# Patient Record
Sex: MALE | Race: WHITE | NOT HISPANIC OR LATINO | Employment: OTHER | URBAN - METROPOLITAN AREA
[De-identification: names, ages, dates, MRNs, and addresses within clinical notes are randomized per-mention and may not be internally consistent; named-entity substitution may affect disease eponyms.]

---

## 2017-01-25 ENCOUNTER — APPOINTMENT (OUTPATIENT)
Dept: PREADMISSION TESTING | Facility: HOSPITAL | Age: 77
End: 2017-01-25

## 2017-09-21 ENCOUNTER — OFFICE VISIT (OUTPATIENT)
Dept: ORTHOPEDIC SURGERY | Facility: CLINIC | Age: 77
End: 2017-09-21

## 2017-09-21 VITALS
DIASTOLIC BLOOD PRESSURE: 75 MMHG | HEART RATE: 58 BPM | BODY MASS INDEX: 31.5 KG/M2 | HEIGHT: 71 IN | SYSTOLIC BLOOD PRESSURE: 155 MMHG | WEIGHT: 225 LBS

## 2017-09-21 DIAGNOSIS — Z96.652 HISTORY OF LEFT KNEE REPLACEMENT: Primary | ICD-10-CM

## 2017-09-21 PROCEDURE — 99213 OFFICE O/P EST LOW 20 MIN: CPT | Performed by: PHYSICIAN ASSISTANT

## 2017-09-21 RX ORDER — OMEPRAZOLE 20 MG/1
CAPSULE, DELAYED RELEASE ORAL
COMMUNITY
Start: 2017-06-23

## 2017-09-21 NOTE — PROGRESS NOTES
Patient: Romulo Pinzon  : 1940    Primary Care Provider: Rianna Bradley MD    Requesting Provider: As above    Follow-up of the Left Knee (1 year)      History    Chief Complaint: routine f/up L TKA  Dr. Abbott    History of Present Illness: Patient returns for routine annual follow-up left total knee arthroplasty with Dr. Cornell in 2016.  He reports no pain or problems with the knee.  He is been very happy with the outcome.  He is exercising on a regular basis with stationary bicycle and strength training.  No complaints.        Allergies no known allergies  No current outpatient prescriptions on file prior to visit.     No current facility-administered medications on file prior to visit.      Social History     Social History   • Marital status:      Spouse name: N/A   • Number of children: N/A   • Years of education: N/A     Occupational History   • Not on file.     Social History Main Topics   • Smoking status: Never Smoker   • Smokeless tobacco: Never Used   • Alcohol use Yes      Comment: Occasional   • Drug use: No   • Sexual activity: Defer     Other Topics Concern   • Not on file     Social History Narrative     Past Surgical History:   Procedure Laterality Date   • APPENDECTOMY     • CATARACT EXTRACTION     • CHOLECYSTECTOMY     • CORONARY STENT PLACEMENT     • FEMUR SURGERY      right leg main artery   • HERNIA REPAIR     • OTHER SURGICAL HISTORY      leg circulation surgery   • REPLACEMENT TOTAL KNEE Left    • TONSILLECTOMY     • VASECTOMY       Family History   Problem Relation Age of Onset   • Heart disease Mother    • Stroke Father    • Hypertension Father    • Heart disease Father      Past Medical History:   Diagnosis Date   • Esophagitis    • Heart disease    • Hepatitis    • Hypertension    • Primary osteoarthritis of both knees    • Thin blood          Review of Systems   Constitutional: Negative.    HENT: Positive for postnasal drip and sneezing.    Eyes:  "Positive for itching.   Respiratory: Negative.    Cardiovascular: Negative.    Gastrointestinal: Negative.    Endocrine: Negative.    Genitourinary: Negative.    Musculoskeletal: Negative.    Skin: Negative.    Allergic/Immunologic: Negative.    Neurological: Negative.    Hematological: Negative.    Psychiatric/Behavioral: Negative.        The following portions of the patient's history were reviewed and updated as appropriate: allergies, current medications, past family history, past medical history, past social history, past surgical history and problem list.    Objective   /75  Pulse 58  Ht 71\" (180.3 cm)  Wt 225 lb (102 kg)  BMI 31.38 kg/m2    Physical Exam:   GENERAL: Body habitus: obese    Lower extremity edema: Left: none; Right: none    Varicose veins:  Left: none; Right: mild    Gait: normal     Mental Status:  awake and alert; oriented to person, place, and time    Voice:  clear  SKIN:  Normal    Hair Growth:  Right:normal; Left:  normal  HEENT: Head: Normocephalic, no lesions, without obvious abnormality.     Eyes: sclera anicteric  PULM:  Repiratory effort normal    Ortho Exam  Left Hip Exam  ---------  FLEXION CONTRACTURE: None  FLEXION: 110 degrees  INTERNAL ROTATION: 20 degrees at 90 degrees of flexion   EXTERNAL ROTATION: 40 degrees at 90 degrees of flexion    PAIN WITH HIP MOTION: no      Left Knee Exam  ----------  Knee Exam:  ----------  ALIGNMENT:  Left: neutral  ----------  RANGE OF MOTION:  Left: 0-120  LIGAMENTOUS STABILITY:   Left:stable to varus and valgus stress at terminal extension and 30 degrees without any evidence of laxity   ----------  STRENGTH:  KNEE FLEXION  Left 5/5  KNEE EXTENSION Left 5/5  ----------  PAIN WITH PALPATION: Left denies tenderness to palpation about the knee  PAIN WITH KNEE ROM:  Left no  PATELLAR CREPITUS:  Left no  ----------  SENSATION TO LIGHT TOUCH:  DEEP PERONEAL/SUPERFICIAL PERONEAL/SURAL/SAPHENOUS/TIBIAL:   Left intact  ----------SENSATION TO " LIGHT TOUCH:  EDEMA:   Left:  no  ERYTHEMA:  ; Left:  no  WOUNDS/INCISIONS: well healed anterior incision, no overlying skin problems.      Medical Decision Making    Data Review:   ordered and reviewed x-rays today    Assessment and Plan/ Diagnosis/Treatment options:   Doing well status post left total knee arthroplasty with Dr. Abbott figure 2016.  I reviewed today's x-rays and clinical findings with the patient.  X-rays today show well-positioned, cemented total knee arthroplasty with no evidence of osteolysis, subsidence or fracture.  On exam, he has good range of motion with stable ligamentous exam.  Patient reports no pain or problems from the knees he is doing a regular exercise routine to help maintain his strength.  Plan is continued observation.  He'll return in 2 years with repeat x-rays of left knee or sooner if needed.

## 2019-09-19 ENCOUNTER — OFFICE VISIT (OUTPATIENT)
Dept: ORTHOPEDIC SURGERY | Facility: CLINIC | Age: 79
End: 2019-09-19

## 2019-09-19 VITALS — HEART RATE: 61 BPM | BODY MASS INDEX: 30.92 KG/M2 | HEIGHT: 70 IN | WEIGHT: 216 LBS | OXYGEN SATURATION: 98 %

## 2019-09-19 DIAGNOSIS — Z96.652 HX OF TOTAL KNEE ARTHROPLASTY, LEFT: Primary | ICD-10-CM

## 2019-09-19 PROCEDURE — 99212 OFFICE O/P EST SF 10 MIN: CPT | Performed by: PHYSICIAN ASSISTANT

## 2019-09-19 NOTE — PROGRESS NOTES
[facility] [place] [facility] Note    Subjective     Patient: Romulo Pinzon  : 1940    Primary Care Provider: Rianna Bradley MD    Requesting Provider: As above    Follow-up (2 year recheck - f/up L TKA  Dr. Abbott)      History    Chief Complaint: Follow-up left total knee arthroplasty    History of Present Illness: Patient returns today for routine follow-up of left total knee Peter plasty with Dr. Cornell in 2016.  He reports no pain or problems with the knee.  He reports is the best thing he has ever done.    Current Outpatient Medications on File Prior to Visit   Medication Sig Dispense Refill   • amLODIPine (NORVASC) 10 MG tablet Take  by mouth Take As Directed.     • aspirin 81 MG tablet Take 81 mg by mouth Daily.     • Azilsartan Medoxomil 40 MG tablet Take  by mouth Daily.     • calcium carbonate (ANTACID CALCIUM) 500 MG chewable tablet Chew 2 tablets Daily.     • celecoxib (CeleBREX) 200 MG capsule Take  by mouth Take As Directed.     • Cholecalciferol (VITAMIN D3) 2000 units tablet Take 2 tablets by mouth Daily.     • clopidogrel (PLAVIX) 75 MG tablet Take 75 mg by mouth Daily.     • esomeprazole (nexIUM) 20 MG capsule Take 20 mg by mouth Daily.     • fenofibrate (TRICOR) 145 MG tablet Take 145 mg by mouth Daily.     • LORazepam (ATIVAN) 0.5 MG tablet Take  by mouth As Needed.     • metoprolol succinate XL (TOPROL-XL) 25 MG 24 hr tablet Take  by mouth Take As Directed.     • NEOMYCIN-POLYMYXIN-HC OT Administer 5 drops into ears 3 (Three) Times a Day.     • nitroglycerin (NITROSTAT) 0.4 MG SL tablet Place  under the tongue Take As Directed.     • omeprazole (priLOSEC) 20 MG capsule      • potassium chloride (MICRO-K) 10 MEQ CR capsule Take 10 mEq by mouth Daily.     • rosuvastatin (CRESTOR) 10 MG tablet Take 10 mg by mouth Daily.     • timolol (TIMOPTIC-XR) 0.5 % ophthalmic gel-forming Apply 1 drop to eye Daily.     • omega-3 acid ethyl esters (LOVAZA) 1 g capsule Take 2 g by  mouth Daily.     • oxyCODONE-acetaminophen (PERCOCET) 5-325 MG per tablet Take  by mouth Take As Directed.       No current facility-administered medications on file prior to visit.       No Known Allergies   Past Medical History:   Diagnosis Date   • Esophagitis    • Heart disease    • Hepatitis    • Hypertension    • Primary osteoarthritis of both knees    • Thin blood (CMS/HCC)      Past Surgical History:   Procedure Laterality Date   • APPENDECTOMY     • CATARACT EXTRACTION     • CHOLECYSTECTOMY     • CORONARY STENT PLACEMENT     • FEMUR SURGERY      right leg main artery   • HERNIA REPAIR     • OTHER SURGICAL HISTORY      leg circulation surgery   • REPLACEMENT TOTAL KNEE Left    • TONSILLECTOMY     • VASECTOMY       Family History   Problem Relation Age of Onset   • Heart disease Mother    • Stroke Father    • Hypertension Father    • Heart disease Father       Social History     Socioeconomic History   • Marital status:      Spouse name: Not on file   • Number of children: Not on file   • Years of education: Not on file   • Highest education level: Not on file   Tobacco Use   • Smoking status: Never Smoker   • Smokeless tobacco: Never Used   Substance and Sexual Activity   • Alcohol use: No     Frequency: Never   • Drug use: No   • Sexual activity: Defer        Review of Systems   Constitutional: Negative.    HENT: Negative.    Eyes: Negative.    Respiratory: Negative.    Cardiovascular: Negative.    Gastrointestinal: Negative.    Endocrine: Negative.    Genitourinary: Negative.    Musculoskeletal: Positive for arthralgias.   Skin: Negative.    Allergic/Immunologic: Negative.    Neurological: Negative.    Hematological: Negative.    Psychiatric/Behavioral: Negative.        The following portions of the patient's history were reviewed and updated as appropriate: allergies, current medications, past family history, past medical history, past social history, past surgical history and problem  "list.      Objective      Physical Exam  Pulse 61   Ht 177.8 cm (70\")   Wt 98 kg (216 lb)   SpO2 98%   BMI 30.99 kg/m²     Body mass index is 30.99 kg/m².    Patient is well developed, well nourished and in no acute distress.  Alert and oriented x 3.    Ortho Exam  Left Knee Exam  ----------  Knee Exam:  ----------  ALIGNMENT:  Left: neutral  ----------  RANGE OF MOTION:  Left: 0-135  LIGAMENTOUS STABILITY:   Left:stable to varus and valgus stress at terminal extension and 30 degrees without any evidence of laxity   ----------  STRENGTH:  KNEE FLEXION  Left 5/5  KNEE EXTENSION Left 5/5  ----------  PAIN WITH PALPATION: Left denies tenderness to palpation about the knee  PAIN WITH KNEE ROM:  Left no  PATELLAR CREPITUS:  Left no  ----------  SENSATION TO LIGHT TOUCH:  DEEP PERONEAL/SUPERFICIAL PERONEAL/SURAL/SAPHENOUS/TIBIAL:   Left intact  ----------  EFFUSION:   Left:  no  ERYTHEMA:  ; Left:  no  WOUNDS/INCISIONS: well healed anterior knee incision, no overlying skin problems.      Medical Decision Making    Data Review:   ordered and reviewed x-rays today    Assessment:  1. Hx of total knee arthroplasty, left        Plan:  Doing well status post left total knee arthroplasty with Dr. Cornell in 2016.  X-rays today show well-positioned, cemented total knee arthroplasty with no evidence of ostial lysis, subsidence or fracture.  Patient reports no pain or problems with the knee.  Plan is continued observation.  He will return in 2 years with repeat x-rays or sooner if needed.      Ale Bertrand PA-C  09/19/19  2:56 PM    "

## 2021-02-19 ENCOUNTER — APPOINTMENT (OUTPATIENT)
Dept: VACCINE CLINIC | Facility: HOSPITAL | Age: 81
End: 2021-02-19

## 2021-03-02 ENCOUNTER — APPOINTMENT (OUTPATIENT)
Dept: VACCINE CLINIC | Facility: HOSPITAL | Age: 81
End: 2021-03-02

## 2021-03-10 ENCOUNTER — OFFICE VISIT (OUTPATIENT)
Dept: ORTHOPEDIC SURGERY | Facility: CLINIC | Age: 81
End: 2021-03-10

## 2021-03-10 VITALS
SYSTOLIC BLOOD PRESSURE: 157 MMHG | HEIGHT: 70 IN | HEART RATE: 56 BPM | DIASTOLIC BLOOD PRESSURE: 70 MMHG | BODY MASS INDEX: 31.07 KG/M2 | WEIGHT: 217 LBS

## 2021-03-10 DIAGNOSIS — Z96.652 HX OF TOTAL KNEE ARTHROPLASTY, LEFT: Primary | ICD-10-CM

## 2021-03-10 PROCEDURE — 99212 OFFICE O/P EST SF 10 MIN: CPT | Performed by: PHYSICIAN ASSISTANT

## 2021-03-10 RX ORDER — DORZOLAMIDE HYDROCHLORIDE AND TIMOLOL MALEATE 20; 5 MG/ML; MG/ML
SOLUTION/ DROPS OPHTHALMIC
COMMUNITY
Start: 2021-02-03

## 2021-03-10 RX ORDER — DUTASTERIDE AND TAMSULOSIN HYDROCHLORIDE CAPSULES .5; .4 MG/1; MG/1
CAPSULE ORAL
COMMUNITY
Start: 2021-03-03

## 2021-03-10 RX ORDER — LATANOPROST 50 UG/ML
SOLUTION/ DROPS OPHTHALMIC
COMMUNITY
Start: 2021-02-21

## 2024-01-18 ENCOUNTER — TRANSCRIBE ORDERS (OUTPATIENT)
Dept: CT IMAGING | Facility: CLINIC | Age: 84
End: 2024-01-18
Payer: MEDICARE

## 2024-01-18 DIAGNOSIS — J06.9 URTI (ACUTE UPPER RESPIRATORY INFECTION): Primary | ICD-10-CM

## 2024-12-11 NOTE — PROGRESS NOTES
[facility] [place] [facility] Clinic Note    Subjective     Patient: Romulo Pinzon  : 1940    Primary Care Provider: Rianna Bradley MD    Requesting Provider: As above    Follow-up (18 month recheck- s/p L TKA  Dr. Abbott)      History    Chief Complaint: Follow-up left total knee    History of Present Illness: Patient returns today for routine follow-up left total knee arthroplasty with  in .  He reports no pain or problems with the knee.  Has been having very happy.    Current Outpatient Medications on File Prior to Visit   Medication Sig Dispense Refill   • amLODIPine (NORVASC) 10 MG tablet Take  by mouth Take As Directed.     • aspirin 81 MG tablet Take 81 mg by mouth Daily.     • calcium carbonate (ANTACID CALCIUM) 500 MG chewable tablet Chew 2 tablets Daily.     • Cholecalciferol (VITAMIN D3) 2000 units tablet Take 2 tablets by mouth Daily.     • clopidogrel (PLAVIX) 75 MG tablet Take 75 mg by mouth Daily.     • dorzolamide-timolol (COSOPT) 22.3-6.8 MG/ML ophthalmic solution      • dutasteride-tamsulosin (BARRIE) 0.5-0.4 MG capsule capsule      • esomeprazole (nexIUM) 20 MG capsule Take 20 mg by mouth Daily.     • fenofibrate (TRICOR) 145 MG tablet Take 145 mg by mouth Daily.     • latanoprost (XALATAN) 0.005 % ophthalmic solution      • LORazepam (ATIVAN) 0.5 MG tablet Take  by mouth As Needed.     • metoprolol succinate XL (TOPROL-XL) 25 MG 24 hr tablet Take  by mouth Take As Directed.     • nitroglycerin (NITROSTAT) 0.4 MG SL tablet Place  under the tongue Take As Directed.     • omega-3 acid ethyl esters (LOVAZA) 1 g capsule Take 2 g by mouth Daily.     • omeprazole (priLOSEC) 20 MG capsule      • rosuvastatin (CRESTOR) 10 MG tablet Take 10 mg by mouth Daily.       No current facility-administered medications on file prior to visit.      No Known Allergies   Past Medical History:   Diagnosis Date   • Esophagitis    • Heart disease    • Hepatitis    • Hypertension   "  • Primary osteoarthritis of both knees    • Thin blood (CMS/HCC)      Past Surgical History:   Procedure Laterality Date   • APPENDECTOMY     • CATARACT EXTRACTION     • CHOLECYSTECTOMY     • CORONARY STENT PLACEMENT     • FEMUR SURGERY      right leg main artery   • HERNIA REPAIR     • OTHER SURGICAL HISTORY      leg circulation surgery   • REPLACEMENT TOTAL KNEE Left    • TONSILLECTOMY     • VASECTOMY       Family History   Problem Relation Age of Onset   • Heart disease Mother    • Stroke Father    • Hypertension Father    • Heart disease Father       Social History     Socioeconomic History   • Marital status:      Spouse name: Not on file   • Number of children: Not on file   • Years of education: Not on file   • Highest education level: Not on file   Tobacco Use   • Smoking status: Never Smoker   • Smokeless tobacco: Never Used   Substance and Sexual Activity   • Alcohol use: No   • Drug use: No   • Sexual activity: Defer        Review of Systems   Constitutional: Negative.    HENT: Negative.    Eyes: Negative.    Respiratory: Negative.    Cardiovascular: Negative.    Gastrointestinal: Negative.    Endocrine: Negative.    Genitourinary: Negative.    Musculoskeletal: Positive for arthralgias.   Skin: Negative.    Allergic/Immunologic: Negative.    Neurological: Negative.    Hematological: Negative.    Psychiatric/Behavioral: Negative.        The following portions of the patient's history were reviewed and updated as appropriate: allergies, current medications, past family history, past medical history, past social history, past surgical history and problem list.      Objective      Physical Exam  /70   Pulse 56   Ht 177.8 cm (70\")   Wt 98.4 kg (217 lb)   BMI 31.14 kg/m²     Body mass index is 31.14 kg/m².    Patient is well developed, well nourished and in no acute distress.  Alert and oriented x 3.    Ortho Exam  Left knee exam: Anterior knee incision is well-healed.  Range of motion 0-1 30 " Patient tolerated procedure well. ligament stable to valgus varus stress.  Neurovascular intact distally.  Normal gait.    Medical Decision Making    Data Review:   ordered and reviewed x-rays today    Assessment:  1. Hx of total knee arthroplasty, left        Plan:  Doing well status post left total knee arthroplasty with Dr. Cornell in February 2016.  X-rays show well-positioned total knee arthroplasty with no evidence of osteolysis, signs of fracture.  Patient is now 5 years out we no longer need to see him on an annual basis.  We will see him back see him back should he be having any concerns or problems.  He also discussed possible need for evaluation of his right knee pain.      Ale Bertrand PA-C  03/18/21  14:15 EDT